# Patient Record
Sex: FEMALE | Race: WHITE | NOT HISPANIC OR LATINO | Employment: FULL TIME | URBAN - METROPOLITAN AREA
[De-identification: names, ages, dates, MRNs, and addresses within clinical notes are randomized per-mention and may not be internally consistent; named-entity substitution may affect disease eponyms.]

---
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
## 2020-02-11 ENCOUNTER — OFFICE VISIT (OUTPATIENT)
Dept: GASTROENTEROLOGY | Facility: CLINIC | Age: 20
End: 2020-02-11

## 2020-02-11 VITALS
TEMPERATURE: 97.7 F | WEIGHT: 170.6 LBS | OXYGEN SATURATION: 99 % | DIASTOLIC BLOOD PRESSURE: 90 MMHG | HEART RATE: 109 BPM | BODY MASS INDEX: 31.39 KG/M2 | HEIGHT: 62 IN | SYSTOLIC BLOOD PRESSURE: 132 MMHG

## 2020-02-11 DIAGNOSIS — K58.1 IRRITABLE BOWEL SYNDROME WITH CONSTIPATION: Primary | ICD-10-CM

## 2020-02-11 DIAGNOSIS — R10.84 GENERALIZED ABDOMINAL PAIN: ICD-10-CM

## 2020-02-11 DIAGNOSIS — Z87.19 HISTORY OF CROHN'S DISEASE: ICD-10-CM

## 2020-02-11 PROCEDURE — 99213 OFFICE O/P EST LOW 20 MIN: CPT | Performed by: NURSE PRACTITIONER

## 2020-02-11 RX ORDER — NAPROXEN 250 MG/1
250 TABLET ORAL 2 TIMES DAILY PRN
COMMUNITY

## 2020-02-11 RX ORDER — CHLORCYCLIZINE HYDROCHLORIDE AND PSEUDOEPHEDRINE HYDROCHLORIDE 25; 60 MG/1; MG/1
TABLET ORAL AS NEEDED
COMMUNITY
Start: 2020-01-06 | End: 2023-02-14

## 2020-02-11 RX ORDER — PANTOPRAZOLE SODIUM 40 MG/1
TABLET, DELAYED RELEASE ORAL DAILY
COMMUNITY
Start: 2020-02-10 | End: 2023-02-14

## 2020-02-11 RX ORDER — HYDROCHLOROTHIAZIDE 12.5 MG/1
TABLET ORAL DAILY
COMMUNITY
Start: 2020-01-23 | End: 2023-02-14

## 2020-02-11 NOTE — PROGRESS NOTES
GASTROENTEROLOGY OUTPATIENT ESTABLISHED PATIENT NOTE  Patient: ANN EUCEDA : 2000  Date of Service: 2020  CC: Follow-up (Ibs, Crohn's disease, constipation)     Assessment/Plan                                             ASSESSMENT & PLANS     Irritable bowel syndrome with constipation  Generalized abdominal pain  History of Crohn's disease  -     ENDOSCOPY AND COLONOSCOPY; Future  · Increase water intake from 48 oz water a day to 60-80 oz a day  · Start OTC stool softeners 2 cap po once daily    Follow Up:Return in about 3 months (around 2020).      DISCUSSION: The above plan was delineated in details with patient and mother and all questions and concerns were answered.  Patient is also given contact information.  Patient is to return as scheduled or sooner if new problems arise.   Subjective                                                     SUBJECTIVE   History of Present Illness  Ms. Ann Euceda is a 19 y.o. female who is here for Follow-up (Ibs, Crohn's disease, constipation)  Burning sensation in the stomach. Drinks more water.  Able to have more BMs, but still has frequent generalized abd pain/tenderness and abn stools. Still a lot of tenesmus. Goes to the bathroom often for BM attempts. Only able to have a BM 50% of the time of going to the bathroom.  Does not like vegetables. Has seen a dietician in the past.   No longer on elavil for HA and anxiety. No longer on Wellbutrin d/t wt gain.  Miralax and Linzess caused diarrhea. Has not taken NSAIDs.      Prior work up here in 2018 w/ CBC, CMP, Celiac panel, CRP, MARJORIE, fecal calprotectin was normal  Hx of nonspecific IBD at age 13 in  and was tx w/ Lialda and [place] by 's pediatric GI at one point.  Repeat EGD and colonoscopy in  did not show evidence of IBD.    Per outside records fr [facility] [place]:   EGD, done on 10/12/15 by Dr Tony Bangura at , showed gastritis, but normal esophagus and duodenum. Gastric bx  showed mild nonspecific chronic gastritis. Esophageal and duodenal bx WNL  Colonoscopy, done on 10/12/15, showed normal colon mucosa to the terminal ileum.     ROS:Review of Systems   Constitutional: Negative.         Pt currently or recently takes NSAIDS ( (i.e Ibuprofen, Aleve, Advil, Exedrin, BC Powder, diclofenac, meloxicam, & Naproxen, etc)? Yes    Pt currently or recently takes abx? No   HENT: Negative.    Eyes: Negative.    Respiratory: Negative.    Cardiovascular: Negative.    Gastrointestinal: Positive for abdominal pain, constipation, diarrhea and nausea.        Burning in stomach   Endocrine: Negative.    Genitourinary: Negative.    Musculoskeletal: Negative.    Skin: Negative.    Allergic/Immunologic: Negative.    Neurological: Negative.    Hematological: Negative.    Psychiatric/Behavioral: Negative.      Objective                                                           OBJECTIVE   Allergy: Pt is allergic to lactose intolerance (gi) and sulfasalazine.  MEDS•  EMGALITY 120 MG/ML prefilled syringe, Every 30 (Thirty) Days., Disp: , Rfl:   •  hydroCHLOROthiazide (HYDRODIURIL) 12.5 MG tablet, Daily., Disp: , Rfl:   •  naproxen (NAPROSYN) 250 MG tablet, Take 250 mg by mouth 2 (Two) Times a Day As Needed., Disp: , Rfl:   •  norethindrone-ethinyl estradiol-iron (MICROGESTIN FE1.5/30) 1.5-30 MG-MCG tablet, Daily., Disp: , Rfl:   •  pantoprazole (PROTONIX) 40 MG EC tablet, Daily., Disp: , Rfl:   •  sertraline (ZOLOFT) 50 MG tablet, 100 mg Daily., Disp: , Rfl:   •  STAHIST AD 25-60 MG tablet, As Needed., Disp: , Rfl:     Lab Results   Component Value Date    WBC 8.10 11/13/2018    EOSABS 0.08 11/13/2018    HGB 14.2 11/13/2018    HCT 42.7 11/13/2018    MCV 86.4 11/13/2018    MCHC 33.3 11/13/2018     11/13/2018     Lab Results   Component Value Date    RDW 12.6 11/13/2018    MCHC 33.3 11/13/2018     Lab Results   Component Value Date     11/13/2018    K 4.2 11/13/2018     11/13/2018    CO2 26.0  11/13/2018    BUN 7 (L) 11/13/2018    CREATININE 0.63 11/13/2018    EGFRIFNONA 123 11/13/2018    GLUCOSE 102 (H) 11/13/2018    CALCIUM 9.3 11/13/2018    ANIONGAP 7.0 11/13/2018     Lab Results   Component Value Date    AST 19 11/13/2018    ALT 21 11/13/2018    ALKPHOS 92 11/13/2018    BILITOT 0.2 (L) 11/13/2018    ALBUMIN 4.53 11/13/2018      Lab Results   Component Value Date    CRP 0.78 11/13/2018      Wt Readings from Last 5 Encounters:   02/11/20 77.4 kg (170 lb 9.6 oz) (92 %, Z= 1.39)*   10/29/19 75.4 kg (166 lb 3.2 oz) (90 %, Z= 1.31)*   02/12/19 69.9 kg (154 lb 3.2 oz) (85 %, Z= 1.05)*   11/13/18 72.1 kg (159 lb) (89 %, Z= 1.20)*     * Growth percentiles are based on CDC (Girls, 2-20 Years) data.   body mass index is 31.2 kg/m².,Temp: 97.7 °F (36.5 °C),BP: 132/90,Heart Rate: 109   Physical Exam  General Well developed; well nourished. NAD  ENT Anicteric sclerae. Oral mucosa pink and moist without thrush or lesions    GI Abd soft, mild generalized, diffused TTP, ND, normal active bowel sounds.  No abd hernia    Pt care team: Emily FREY & ASHLEE Sheets  02/11/20 8:01 AM  [facility]--Gastroenterology  [phone]    CC: , Maria L BURNS, PA   [address] / [facility] [place] [address] FAX:[phone]

## 2020-02-13 ENCOUNTER — TELEPHONE (OUTPATIENT)
Dept: GASTROENTEROLOGY | Facility: CLINIC | Age: 20
End: 2020-02-13

## 2020-02-13 NOTE — TELEPHONE ENCOUNTER
Brianna  pls let pt know that we already did blood and stool tests before and they were normal.    I recommend pt have a f/u office visit w/ Dr Olguin in May since I've seen her 4 times already and unable to help her fully.    .

## 2020-02-13 NOTE — TELEPHONE ENCOUNTER
Emily,  Patient called and left a message stating that she need to cancel her Colonoscopy;its going to cost $2500 which she can't afford right now. She wants to know if there is anything else you can do like stool test, blood work. Request to talk to you. Thanks

## 2023-02-14 ENCOUNTER — OFFICE VISIT (OUTPATIENT)
Dept: FAMILY MEDICINE CLINIC | Facility: CLINIC | Age: 23
End: 2023-02-14
Payer: COMMERCIAL

## 2023-02-14 VITALS
OXYGEN SATURATION: 100 % | SYSTOLIC BLOOD PRESSURE: 144 MMHG | BODY MASS INDEX: 32.16 KG/M2 | HEART RATE: 99 BPM | WEIGHT: 181.5 LBS | HEIGHT: 63 IN | TEMPERATURE: 97.7 F | DIASTOLIC BLOOD PRESSURE: 106 MMHG

## 2023-02-14 DIAGNOSIS — R06.2 WHEEZING: ICD-10-CM

## 2023-02-14 DIAGNOSIS — F31.9 BIPOLAR 1 DISORDER: Primary | ICD-10-CM

## 2023-02-14 DIAGNOSIS — F41.9 ANXIETY: ICD-10-CM

## 2023-02-14 DIAGNOSIS — I10 PRIMARY HYPERTENSION: ICD-10-CM

## 2023-02-14 DIAGNOSIS — G43.709 CHRONIC MIGRAINE WITHOUT AURA WITHOUT STATUS MIGRAINOSUS, NOT INTRACTABLE: ICD-10-CM

## 2023-02-14 DIAGNOSIS — E66.09 CLASS 1 OBESITY DUE TO EXCESS CALORIES WITH SERIOUS COMORBIDITY AND BODY MASS INDEX (BMI) OF 32.0 TO 32.9 IN ADULT: ICD-10-CM

## 2023-02-14 PROBLEM — G43.111 INTRACTABLE MIGRAINE WITH AURA WITH STATUS MIGRAINOSUS: Status: ACTIVE | Noted: 2022-02-16

## 2023-02-14 PROCEDURE — 99204 OFFICE O/P NEW MOD 45 MIN: CPT | Performed by: FAMILY MEDICINE

## 2023-02-14 RX ORDER — LOPERAMIDE HYDROCHLORIDE 2 MG/1
2 CAPSULE ORAL DAILY
COMMUNITY

## 2023-02-14 RX ORDER — ALBUTEROL SULFATE 90 UG/1
2 AEROSOL, METERED RESPIRATORY (INHALATION) EVERY 4 HOURS PRN
Qty: 18 G | Refills: 5 | Status: SHIPPED | OUTPATIENT
Start: 2023-02-14

## 2023-02-14 RX ORDER — OMEPRAZOLE 40 MG/1
40 CAPSULE, DELAYED RELEASE ORAL 2 TIMES DAILY
COMMUNITY
Start: 2022-10-27 | End: 2023-05-22 | Stop reason: SDUPTHER

## 2023-02-14 NOTE — PROGRESS NOTES
Date: 2023   Patient Name: Ann Euceda  : 2000   MRN: [id number]     Chief Complaint:    Chief Complaint   Patient presents with   • Anxiety   • Chest Pain     Difficulty breathing    • Mass     Left arm mass        History of Present Illness: Ann Euceda is a 23 y.o. female who is here today to establish care.  She reports history of depression and anxiety with symptoms of panic attacks including chest pain and difficulty breathing.  She does report that she often has times of severe depression as well as times of anxiety with agitation.  She is not currently on any medication for this.    Blood pressure is significantly elevated in office today.  She reports history of hypertension however has not been taking medication for this anytime recently.  Father has history of hypertension.    She also reports history of episodic migraines and will have more than 4 migraines monthly.  Migraines are debilitating and affect her ability to work.              Review of Systems:   Review of Systems   Constitutional: Negative for chills, fatigue and fever.   Respiratory: Positive for chest tightness and wheezing. Negative for cough and shortness of breath.    Cardiovascular: Negative for chest pain and palpitations.   Gastrointestinal: Negative for abdominal pain, constipation, diarrhea, nausea and vomiting.   Musculoskeletal: Negative for back pain and myalgias.   Neurological: Positive for headache. Negative for dizziness.   Psychiatric/Behavioral: Positive for sleep disturbance, depressed mood and stress. The patient is nervous/anxious.        Past Medical History:   Past Medical History:   Diagnosis Date   • Anxiety    • Bronchitis    • Crohn's disease    • Ear infection    • Gastritis    • GERD (gastroesophageal reflux disease)    • Headache    • Hypertension    • Irritable bowel syndrome    • Lactose intolerance        Past Surgical History:   Past Surgical History:   Procedure  Laterality Date   • CHOLECYSTECTOMY     • COLONOSCOPY      two ( Dr Bangura)   • WISDOM TOOTH EXTRACTION         Family History:   Family History   Problem Relation Age of Onset   • Hyperlipidemia Father    • Hypertension Father    • Hyperlipidemia Sister    • Diabetes Maternal Grandmother    • Hyperlipidemia Maternal Grandfather    • Diabetes Paternal Grandmother    • Hyperlipidemia Paternal Grandfather        Social History:   Social History     Socioeconomic History   • Marital status: Single   Tobacco Use   • Smoking status: Never   • Smokeless tobacco: Never   Vaping Use   • Vaping Use: Never used   Substance and Sexual Activity   • Alcohol use: No   • Drug use: No   • Sexual activity: Never       Medications:     Current Outpatient Medications:   •  loperamide (IMODIUM) 2 MG capsule, Take 1 capsule by mouth Daily., Disp: , Rfl:   •  naproxen (NAPROSYN) 250 MG tablet, Take 1 tablet by mouth 2 (Two) Times a Day As Needed., Disp: , Rfl:   •  omeprazole (priLOSEC) 40 MG capsule, Take 1 capsule by mouth Daily., Disp: , Rfl:   •  albuterol sulfate  (90 Base) MCG/ACT inhaler, Inhale 2 puffs Every 4 (Four) Hours As Needed for Wheezing or Shortness of Air., Disp: 18 g, Rfl: 5  •  Cariprazine HCl (Vraylar) 1.5 MG capsule capsule, Take 1 capsule by mouth Daily., Disp: 30 capsule, Rfl: 1  •  losartan (Cozaar) 25 MG tablet, Take 1 tablet by mouth Daily., Disp: 30 tablet, Rfl: 2  •  Qulipta 60 MG tablet, , Disp: , Rfl:     Allergies:   Allergies   Allergen Reactions   • Sulfamethoxazole-Trimethoprim Unknown - High Severity   • Lactose Intolerance (Gi) Diarrhea   • Sulfa Antibiotics Other (See Comments)   • Sulfasalazine Other (See Comments) and Unknown (See Comments)     Body pain  Body pain       PHQ-2 Total Score: 23   PHQ-9 Total Score: 23       Physical Exam:  Vital Signs:   Vitals:    02/14/23 1442   BP: (!) 144/106   BP Location: Right arm   Patient Position: Sitting   Cuff Size: Adult   Pulse: 99   Temp: 97.7  "°F (36.5 °C)   TempSrc: Temporal   SpO2: 100%   Weight: 82.3 kg (181 lb 8 oz)   Height: 160 cm (63\")     Body mass index is 32.15 kg/m².     Physical Exam  Vitals and nursing note reviewed.   Constitutional:       Appearance: Normal appearance. She is obese.   Cardiovascular:      Rate and Rhythm: Normal rate and regular rhythm.      Heart sounds: Normal heart sounds. No murmur heard.  Pulmonary:      Effort: Pulmonary effort is normal.      Breath sounds: Normal breath sounds. No wheezing.   Abdominal:      General: Bowel sounds are normal.      Palpations: Abdomen is soft.   Neurological:      Mental Status: She is alert and oriented to person, place, and time. Mental status is at baseline.   Psychiatric:         Mood and Affect: Mood is anxious.         Behavior: Behavior normal.           Assessment/Plan:   Diagnoses and all orders for this visit:    1. Bipolar 1 disorder (Primary)  Assessment & Plan:  Psychological condition is newly identified.  Patient was given samples of Vraylar and was instructed to take 1.5 mg every other day.  Side effects discussed  Psychological condition  will be reassessed In 3 weeks.      2. Chronic migraine without aura without status migrainosus, not intractable  Assessment & Plan:  Headaches are worsening.  She was given samples of Qulipta for preventive treatment as well as Ubrelvy for abortive treatment.  Side effects discussed.  Follow-up in 3 weeks          3. Primary hypertension  Assessment & Plan:  Hypertension is worsening.  Weight loss.  Regular aerobic exercise.  Ambulatory blood pressure monitoring.  Patient will monitor ambulatory blood pressures and we will discuss treatment if necessary at follow-up appointment  Blood pressure will be reassessed In 3 weeks.      4. Wheezing  Assessment & Plan:  Refilled albuterol inhaler    Orders:  -     albuterol sulfate  (90 Base) MCG/ACT inhaler; Inhale 2 puffs Every 4 (Four) Hours As Needed for Wheezing or Shortness of " Air.  Dispense: 18 g; Refill: 5    5. Anxiety  Assessment & Plan:  I suspect this is part of her bipolar depression.  Treatment with Vraylar as above      6. Class 1 obesity due to excess calories with serious comorbidity and body mass index (BMI) of 32.0 to 32.9 in adult  Assessment & Plan:  Patient's (Body mass index is 32.15 kg/m².) indicates that they are obese (BMI >30) with health conditions that include hypertension . Weight is unchanged. BMI  is above average; BMI management plan is completed. We discussed portion control and increasing exercise.            Follow Up:   Return in about 3 weeks (around 3/7/2023) for Med Recheck.    Sahara Skaggs, DO  [facility]

## 2023-03-02 ENCOUNTER — TELEPHONE (OUTPATIENT)
Dept: FAMILY MEDICINE CLINIC | Facility: CLINIC | Age: 23
End: 2023-03-02

## 2023-03-02 NOTE — TELEPHONE ENCOUNTER
Caller: Ann Euceda    Relationship: Self    Best call back number:     [phone]     What medication are you requesting:    MEDICATION FOR MIGRAINES    If a prescription is needed, what is your preferred pharmacy and phone number:     [facility] [id number] - [place], [place] - [address] - [phone]  - [phone] FX     DR GARCIA

## 2023-03-06 NOTE — TELEPHONE ENCOUNTER
Patient is wanting to try a migraine medicine, from last visit. She couldn't remember the name. She is doing well on anxiety medicine.

## 2023-03-07 DIAGNOSIS — G43.709 CHRONIC MIGRAINE WITHOUT AURA WITHOUT STATUS MIGRAINOSUS, NOT INTRACTABLE: Primary | ICD-10-CM

## 2023-03-07 RX ORDER — ATOGEPANT 60 MG/1
60 TABLET ORAL DAILY
Qty: 30 TABLET | Refills: 5 | Status: SHIPPED | OUTPATIENT
Start: 2023-03-07 | End: 2023-03-24

## 2023-03-24 ENCOUNTER — OFFICE VISIT (OUTPATIENT)
Dept: FAMILY MEDICINE CLINIC | Facility: CLINIC | Age: 23
End: 2023-03-24
Payer: COMMERCIAL

## 2023-03-24 ENCOUNTER — TELEPHONE (OUTPATIENT)
Dept: FAMILY MEDICINE CLINIC | Facility: CLINIC | Age: 23
End: 2023-03-24

## 2023-03-24 VITALS
TEMPERATURE: 97.5 F | HEIGHT: 63 IN | BODY MASS INDEX: 31.52 KG/M2 | OXYGEN SATURATION: 99 % | HEART RATE: 116 BPM | SYSTOLIC BLOOD PRESSURE: 122 MMHG | DIASTOLIC BLOOD PRESSURE: 90 MMHG | WEIGHT: 177.9 LBS

## 2023-03-24 DIAGNOSIS — E66.09 CLASS 1 OBESITY DUE TO EXCESS CALORIES WITH SERIOUS COMORBIDITY AND BODY MASS INDEX (BMI) OF 31.0 TO 31.9 IN ADULT: ICD-10-CM

## 2023-03-24 DIAGNOSIS — R50.9 FEVER, UNSPECIFIED FEVER CAUSE: ICD-10-CM

## 2023-03-24 DIAGNOSIS — U07.1 COVID-19 VIRUS INFECTION: Primary | ICD-10-CM

## 2023-03-24 DIAGNOSIS — I10 PRIMARY HYPERTENSION: ICD-10-CM

## 2023-03-24 PROBLEM — E66.811 CLASS 1 OBESITY DUE TO EXCESS CALORIES WITH SERIOUS COMORBIDITY AND BODY MASS INDEX (BMI) OF 31.0 TO 31.9 IN ADULT: Status: ACTIVE | Noted: 2023-03-24

## 2023-03-24 PROBLEM — K58.9 IRRITABLE BOWEL SYNDROME: Status: ACTIVE | Noted: 2023-03-24

## 2023-03-24 PROBLEM — G43.909 MIGRAINE: Status: ACTIVE | Noted: 2023-03-24

## 2023-03-24 LAB
EXPIRATION DATE: ABNORMAL
FLUAV AG UPPER RESP QL IA.RAPID: NOT DETECTED
FLUBV AG UPPER RESP QL IA.RAPID: NOT DETECTED
INTERNAL CONTROL: ABNORMAL
Lab: ABNORMAL
SARS-COV-2 AG UPPER RESP QL IA.RAPID: DETECTED

## 2023-03-24 RX ORDER — PROMETHAZINE HYDROCHLORIDE 25 MG/1
TABLET ORAL
COMMUNITY
Start: 2023-03-13

## 2023-03-24 RX ORDER — LOSARTAN POTASSIUM 25 MG/1
25 TABLET ORAL DAILY
Qty: 30 TABLET | Refills: 2 | Status: SHIPPED | OUTPATIENT
Start: 2023-03-24

## 2023-03-24 NOTE — LETTER
March 24, 2023     Patient: Ann Euceda   YOB: 2000   Date of Visit: 3/24/2023       To Whom It May Concern:    It is my medical opinion that Ann Euceda may return to work in 5 days on 03/28/2023. She will need to wear a mask for a total of 10 days.           Sincerely,        Sahara Skaggs,     CC: No Recipients

## 2023-03-24 NOTE — ASSESSMENT & PLAN NOTE
Patient will use Tylenol and ibuprofen as needed for fever, headache and body aches.  She may use over-the-counter cough and cold medications as needed.  She was counseled about transmission and the need to wear a mask in public for total of 10 days.  She will call if symptoms are persistent or worsening

## 2023-03-24 NOTE — TELEPHONE ENCOUNTER
Caller: Ann Euceda    Relationship: Self    Best call back number: [phone]    What form or medical record are you requesting: EXCUSE FROM WORK NOTE    Who is requesting this form or medical record from you: PATIENT FOR EMPLOYER    How would you like to receive the form or medical records (pick-up, mail, fax): FAX    If fax, what is the fax number: [phone]    Timeframe paperwork needed: AS SOON AS POSSIBLE    Additional notes: PATIENT STATED SHE WAS IN TODAY AND FORGOT TO ASK FOR A EXCUSE NOTE FOR WORK

## 2023-03-24 NOTE — ASSESSMENT & PLAN NOTE
Hypertension is newly identified.  Weight loss.  Regular aerobic exercise.  Ambulatory blood pressure monitoring.  Patient is not currently sexually active and does not plan to be anytime soon Rx losartan 25 mg daily, side effects discussed including risk for teratogenicity  Blood pressure will be reassessed in 4 weeks.

## 2023-03-24 NOTE — ASSESSMENT & PLAN NOTE
Patient's (Body mass index is 31.51 kg/m².) indicates that they are obese (BMI >30) with health conditions that include hypertension . Weight is unchanged. BMI  is above average; BMI management plan is completed. We discussed portion control and increasing exercise.

## 2023-03-24 NOTE — PROGRESS NOTES
Date: 2023   Patient Name: Ann Euceda  : 2000   MRN: [id number]     Chief Complaint:    Chief Complaint   Patient presents with   • Headache   • Vomiting       History of Present Illness: Ann Euceda is a 22 y.o. female who is here today for Headache, body aches, and vomiting.  A close friend was recently diagnosed with COVID and she has been in close contact with her and has shared food.  Symptoms started last night.    She also reports that ambulatory blood pressures have been elevated on multiple occasions.  She has family history of hypertension and would like to discuss treatment options.           Review of Systems:   Review of Systems   Constitutional: Positive for chills and fever. Negative for fatigue.   HENT: Positive for congestion, ear pain, postnasal drip and sinus pressure.    Respiratory: Positive for cough. Negative for shortness of breath.    Cardiovascular: Negative for chest pain and palpitations.   Gastrointestinal: Negative for abdominal pain, constipation, diarrhea, nausea and vomiting.   Musculoskeletal: Negative for back pain and myalgias.   Neurological: Positive for headache. Negative for dizziness.   Psychiatric/Behavioral: Negative for depressed mood. The patient is not nervous/anxious.        Past Medical History:   Past Medical History:   Diagnosis Date   • Anxiety    • Bronchitis    • Crohn's disease (HCC)    • Ear infection    • Gastritis    • GERD (gastroesophageal reflux disease)    • Headache    • Hypertension    • Irritable bowel syndrome    • Lactose intolerance        Past Surgical History:   Past Surgical History:   Procedure Laterality Date   • CHOLECYSTECTOMY     • COLONOSCOPY      two ( Dr Bangura)   • WISDOM TOOTH EXTRACTION         Family History:   Family History   Problem Relation Age of Onset   • Hyperlipidemia Father    • Hypertension Father    • Hyperlipidemia Sister    • Diabetes Maternal Grandmother    • Hyperlipidemia Maternal  "Grandfather    • Diabetes Paternal Grandmother    • Hyperlipidemia Paternal Grandfather        Social History:   Social History     Socioeconomic History   • Marital status: Single   Tobacco Use   • Smoking status: Never   • Smokeless tobacco: Never   Vaping Use   • Vaping Use: Never used   Substance and Sexual Activity   • Alcohol use: No   • Drug use: No   • Sexual activity: Never       Medications:     Current Outpatient Medications:   •  albuterol sulfate  (90 Base) MCG/ACT inhaler, Inhale 2 puffs Every 4 (Four) Hours As Needed for Wheezing or Shortness of Air., Disp: 18 g, Rfl: 5  •  loperamide (IMODIUM) 2 MG capsule, Take 1 capsule by mouth Daily., Disp: , Rfl:   •  naproxen (NAPROSYN) 250 MG tablet, Take 1 tablet by mouth 2 (Two) Times a Day As Needed., Disp: , Rfl:   •  omeprazole (priLOSEC) 40 MG capsule, Take 1 capsule by mouth Daily., Disp: , Rfl:   •  losartan (Cozaar) 25 MG tablet, Take 1 tablet by mouth Daily., Disp: 30 tablet, Rfl: 2  •  promethazine (PHENERGAN) 25 MG tablet, , Disp: , Rfl:     Allergies:   Allergies   Allergen Reactions   • Sulfamethoxazole-Trimethoprim Unknown - High Severity   • Lactose Intolerance (Gi) Diarrhea   • Sulfa Antibiotics Other (See Comments)   • Sulfasalazine Other (See Comments) and Unknown (See Comments)     Body pain  Body pain         Physical Exam:  Vital Signs:   Vitals:    03/24/23 0929   BP: 122/90   BP Location: Left arm   Patient Position: Sitting   Cuff Size: Adult   Pulse: 116   Temp: 97.5 °F (36.4 °C)   TempSrc: Temporal   SpO2: 99%   Weight: 80.7 kg (177 lb 14.4 oz)   Height: 160 cm (63\")     Body mass index is 31.51 kg/m².     Physical Exam  Vitals and nursing note reviewed.   Constitutional:       Appearance: Normal appearance. She is obese.   HENT:      Right Ear: A middle ear effusion is present.      Left Ear: A middle ear effusion is present.   Cardiovascular:      Rate and Rhythm: Normal rate and regular rhythm.      Heart sounds: Normal " heart sounds. No murmur heard.  Pulmonary:      Effort: Pulmonary effort is normal.      Breath sounds: Normal breath sounds. No wheezing.   Neurological:      Mental Status: She is alert and oriented to person, place, and time. Mental status is at baseline.   Psychiatric:         Mood and Affect: Mood normal.         Behavior: Behavior normal.           Assessment/Plan:   Diagnoses and all orders for this visit:    1. COVID-19 virus infection (Primary)  Assessment & Plan:  Patient will use Tylenol and ibuprofen as needed for fever, headache and body aches.  She may use over-the-counter cough and cold medications as needed.  She was counseled about transmission and the need to wear a mask in public for total of 10 days.  She will call if symptoms are persistent or worsening      2. Primary hypertension  Assessment & Plan:  Hypertension is newly identified.  Weight loss.  Regular aerobic exercise.  Ambulatory blood pressure monitoring.  Patient is not currently sexually active and does not plan to be anytime soon Rx losartan 25 mg daily, side effects discussed including risk for teratogenicity  Blood pressure will be reassessed in 4 weeks.    Orders:  -     losartan (Cozaar) 25 MG tablet; Take 1 tablet by mouth Daily.  Dispense: 30 tablet; Refill: 2    3. Class 1 obesity due to excess calories with serious comorbidity and body mass index (BMI) of 31.0 to 31.9 in adult  Assessment & Plan:  Patient's (Body mass index is 31.51 kg/m².) indicates that they are obese (BMI >30) with health conditions that include hypertension . Weight is unchanged. BMI  is above average; BMI management plan is completed. We discussed portion control and increasing exercise.       4. Fever, unspecified fever cause  -     Covid-19 + Flu A&B AG, Veritor         Follow Up:   Return in about 1 month (around 4/24/2023) for BP check.    Sahara Skaggs, DO  [facility]

## 2023-04-04 ENCOUNTER — TELEPHONE (OUTPATIENT)
Dept: FAMILY MEDICINE CLINIC | Facility: CLINIC | Age: 23
End: 2023-04-04

## 2023-04-04 NOTE — TELEPHONE ENCOUNTER
PATIENT WAS SEEN LAST WEEK OR WEEK BEFORE.  HAS POSITIVE COVID.  HAS A LOT OF NAUSEA.  PHENERGAN NOT HELPING.  WHAT TO DO?    PATIENT WAS ON SAMPLE OF VERALIN(?) AND IS OUT.  NEEDS A PRESCRIPTION.      [facility]:  [facility]    PLEASE CALL [phone]

## 2023-04-10 ENCOUNTER — TELEMEDICINE (OUTPATIENT)
Dept: FAMILY MEDICINE CLINIC | Facility: CLINIC | Age: 23
End: 2023-04-10
Payer: COMMERCIAL

## 2023-04-10 VITALS — BODY MASS INDEX: 31.01 KG/M2 | WEIGHT: 175 LBS | HEIGHT: 63 IN

## 2023-04-10 DIAGNOSIS — F31.9 BIPOLAR 1 DISORDER: Primary | ICD-10-CM

## 2023-04-10 DIAGNOSIS — R11.0 NAUSEA: ICD-10-CM

## 2023-04-10 RX ORDER — ATOGEPANT 60 MG/1
TABLET ORAL
COMMUNITY
Start: 2023-03-30

## 2023-04-10 NOTE — ASSESSMENT & PLAN NOTE
Psychological condition was improving when patient was taken Vraylar.  I am going to send in a prescription for starting dose Vraylar and have her follow-up in a month.

## 2023-04-10 NOTE — PROGRESS NOTES
"Chief Complaint  Nausea (She has been having nausea for several days now.  She also needs a refill on the \"bipolar medication\" Dr. Skaggs gave her. )    Subjective  Patient presents during the COVID-19  pandemic/federally declared state of public health emergency.  For today's visit this provider is located at [facility]. Patient was seen today through synchronous audio/video technology using [email or web address] technology. Verbal consent was obtained. The patient was located at home. Vitals signs were not obtained due to lack of home monitoring access. Time spent with patient was 15 minutes.        Ann Euceda presents to [facility]  History of Present Illness patient states that she has been having nausea for several days now apparently she started having nausea when she had COVID 2 or 3 weeks ago and it seemed to get better then it started back up again.  She has not had any vomiting and its not been associated with reflux or heartburn or abdominal pain.  She was taking a medicine for bipolar that started with a V that Dr. Marlene jordan gave her samples of she cannot recall exactly what it was nor can she tell me what dose she was taking but she ran out of that several days ago and she feels like she needs to be back on it.  She called to get it refilled and was told that she needed an appointment.  Objective   Vital Signs:   Ht 160 cm (63\")   Wt 79.4 kg (175 lb) Comment: Pt reported vitals  BMI 31.00 kg/m²     Body mass index is 31 kg/m².    Review of Systems   Constitutional: Negative for chills, fatigue and fever.   Respiratory: Negative for cough and shortness of breath.    Cardiovascular: Negative for chest pain and palpitations.   Gastrointestinal: Positive for nausea. Negative for abdominal pain, diarrhea, vomiting and GERD.   Musculoskeletal: Negative for back pain and myalgias.   Neurological: Negative for dizziness and headache.   Psychiatric/Behavioral: " Positive for sleep disturbance. Negative for suicidal ideas and depressed mood. The patient is not nervous/anxious.         Irritable, manic       Past History:  Medical History: has a past medical history of Anxiety, Bronchitis, Crohn's disease, Ear infection, Gastritis, GERD (gastroesophageal reflux disease), Headache, Hypertension, Irritable bowel syndrome, and Lactose intolerance.   Surgical History: has a past surgical history that includes Colonoscopy; Cholecystectomy; and [place] tooth extraction.   Family History: family history includes Diabetes in her maternal grandmother and paternal grandmother; Hyperlipidemia in her father, maternal grandfather, paternal grandfather, and sister; Hypertension in her father.   Social History: reports that she has never smoked. She has never used smokeless tobacco. She reports that she does not drink alcohol and does not use drugs.      Current Outpatient Medications:   •  albuterol sulfate  (90 Base) MCG/ACT inhaler, Inhale 2 puffs Every 4 (Four) Hours As Needed for Wheezing or Shortness of Air., Disp: 18 g, Rfl: 5  •  loperamide (IMODIUM) 2 MG capsule, Take 1 capsule by mouth Daily., Disp: , Rfl:   •  losartan (Cozaar) 25 MG tablet, Take 1 tablet by mouth Daily., Disp: 30 tablet, Rfl: 2  •  naproxen (NAPROSYN) 250 MG tablet, Take 1 tablet by mouth 2 (Two) Times a Day As Needed., Disp: , Rfl:   •  omeprazole (priLOSEC) 40 MG capsule, Take 1 capsule by mouth Daily., Disp: , Rfl:   •  Qulipta 60 MG tablet, , Disp: , Rfl:   •  Cariprazine HCl (Vraylar) 1.5 MG capsule capsule, Take 1 capsule by mouth Daily., Disp: 30 capsule, Rfl: 1    Allergies: Sulfamethoxazole-trimethoprim, Lactose intolerance (gi), Sulfa antibiotics, and Sulfasalazine    Physical Exam  Neurological:      Mental Status: She is alert and oriented to person, place, and time.   Psychiatric:         Mood and Affect: Mood normal.         Behavior: Behavior normal.          Result Review :                    Assessment and Plan    Diagnoses and all orders for this visit:    1. Bipolar 1 disorder (Primary)  Assessment & Plan:  Psychological condition was improving when patient was taken Vraylar.  I am going to send in a prescription for starting dose Vraylar and have her follow-up in a month.      2. Nausea  Assessment & Plan:  No other associated symptoms with the nausea and I told her that it may be that she has been off the Vraylar and that could be causing her nausea.  I wanted to see if the nausea would improve once she starts back on the Vraylar and if not she can schedule an appointment in office.      Other orders  -     Cariprazine HCl (Vraylar) 1.5 MG capsule capsule; Take 1 capsule by mouth Daily.  Dispense: 30 capsule; Refill: 1      Follow Up   No follow-ups on file.  Patient was given instructions and counseling regarding her condition or for health maintenance advice. Please see specific information pulled into the AVS if appropriate.     Vesta Quinones PA-C

## 2023-04-10 NOTE — ASSESSMENT & PLAN NOTE
No other associated symptoms with the nausea and I told her that it may be that she has been off the Vraylar and that could be causing her nausea.  I wanted to see if the nausea would improve once she starts back on the Vraylar and if not she can schedule an appointment in office.

## 2023-04-17 NOTE — ASSESSMENT & PLAN NOTE
Patient's (Body mass index is 32.15 kg/m².) indicates that they are obese (BMI >30) with health conditions that include hypertension . Weight is unchanged. BMI  is above average; BMI management plan is completed. We discussed portion control and increasing exercise.

## 2023-04-17 NOTE — ASSESSMENT & PLAN NOTE
Psychological condition is newly identified.  Patient was given samples of Vraylar and was instructed to take 1.5 mg every other day.  Side effects discussed  Psychological condition  will be reassessed In 3 weeks.

## 2023-04-17 NOTE — ASSESSMENT & PLAN NOTE
Hypertension is worsening.  Weight loss.  Regular aerobic exercise.  Ambulatory blood pressure monitoring.  Patient will monitor ambulatory blood pressures and we will discuss treatment if necessary at follow-up appointment  Blood pressure will be reassessed In 3 weeks.

## 2023-04-17 NOTE — ASSESSMENT & PLAN NOTE
Headaches are worsening.  She was given samples of Qulipta for preventive treatment as well as Ubrelvy for abortive treatment.  Side effects discussed.  Follow-up in 3 weeks

## 2023-05-22 ENCOUNTER — OFFICE VISIT (OUTPATIENT)
Dept: FAMILY MEDICINE CLINIC | Facility: CLINIC | Age: 23
End: 2023-05-22
Payer: COMMERCIAL

## 2023-05-22 VITALS
HEART RATE: 88 BPM | BODY MASS INDEX: 30.19 KG/M2 | OXYGEN SATURATION: 99 % | HEIGHT: 63 IN | DIASTOLIC BLOOD PRESSURE: 80 MMHG | WEIGHT: 170.4 LBS | SYSTOLIC BLOOD PRESSURE: 122 MMHG

## 2023-05-22 DIAGNOSIS — R11.0 NAUSEA: ICD-10-CM

## 2023-05-22 DIAGNOSIS — K52.9 IBD (INFLAMMATORY BOWEL DISEASE): ICD-10-CM

## 2023-05-22 DIAGNOSIS — F31.9 BIPOLAR 1 DISORDER: Primary | ICD-10-CM

## 2023-05-22 RX ORDER — NADOLOL 20 MG/1
20 TABLET ORAL DAILY
Qty: 30 TABLET | Refills: 5 | Status: SHIPPED | OUTPATIENT
Start: 2023-05-22

## 2023-05-22 RX ORDER — OMEPRAZOLE 40 MG/1
40 CAPSULE, DELAYED RELEASE ORAL 2 TIMES DAILY
Qty: 60 CAPSULE | Refills: 11 | Status: SHIPPED | OUTPATIENT
Start: 2023-05-22 | End: 2024-05-21

## 2023-05-22 RX ORDER — ARIPIPRAZOLE 5 MG/1
5 TABLET ORAL DAILY
Qty: 30 TABLET | Refills: 5 | Status: SHIPPED | OUTPATIENT
Start: 2023-05-22

## 2023-05-22 NOTE — ASSESSMENT & PLAN NOTE
This is not improving and she is proved difficult to treat having tried several medications with either no effect or resulting side effects I have suggested that she may need to see psychiatry for official testing and diagnostic criteria.  I tere options for treatment and she does want to try something in the same class of medicine as Vraylar and we opted to try Abilify and she understands the risks associated with Abilify as well as the potential side effects.  I have given her some information about local psychiatry offices that she can contact for an appointment.

## 2023-05-22 NOTE — ASSESSMENT & PLAN NOTE
Review of her medicines revealed that in March she started Cozaar and it is possible that this may be causing her nausea so I am going to switch her from the Cozaar to nadolol for blood pressure control I also think the beta-blocker may give her some added anxiety benefit.  We will see her back in 6 weeks to see how she is responding.

## 2023-05-22 NOTE — PROGRESS NOTES
"Chief Complaint  Nausea (X2 months) and Depression (Patient wants to discuss her med)    Subjective          Ann Euceda presents to [facility]  History of Present Illness patient reports that she continues to have nausea it started in March she had COVID back in March and thought  it might be related to that.  She has not had any vomiting or abdominal pain she does have chronic reflux and chronic IBS and she has been reading about the Xifaxan as treatment for IBS with diarrhea and wondered if she would be a candidate to try that.  She also feels like the Vraylar is causing side effects of insomnia and restless leg and she like to try something different for her bipolar depression.  She also does not think the Vraylar is helping much.  She has tried several different antidepressants and nothing has been helpful.  She has had success with controlling her migraines on the Qulipta and she still reports reflux symptoms and is wondering if she can increase her omeprazole.    Objective   Vital Signs:   /80 (BP Location: Right arm)   Pulse 88   Ht 160 cm (63\")   Wt 77.3 kg (170 lb 6.4 oz)   SpO2 99%   BMI 30.19 kg/m²     Body mass index is 30.19 kg/m².    Review of Systems   Constitutional: Negative for chills, fatigue and fever.   Respiratory: Negative for cough and shortness of breath.    Cardiovascular: Negative for chest pain and palpitations.   Gastrointestinal: Positive for diarrhea, nausea and GERD.   Musculoskeletal: Negative for back pain and myalgias.   Neurological: Negative for dizziness and headache.   Psychiatric/Behavioral: Positive for sleep disturbance, positive for hyperactivity, depressed mood and stress. Negative for suicidal ideas. The patient is nervous/anxious.         Angers easily.        Past History:  Medical History: has a past medical history of Anxiety, Bronchitis, Crohn's disease, Ear infection, Gastritis, GERD (gastroesophageal reflux " disease), Headache, Hypertension, Irritable bowel syndrome, and Lactose intolerance.   Surgical History: has a past surgical history that includes Colonoscopy; Cholecystectomy; and [place] tooth extraction.   Family History: family history includes Diabetes in her maternal grandmother and paternal grandmother; Hyperlipidemia in her father, maternal grandfather, paternal grandfather, and sister; Hypertension in her father.   Social History: reports that she has never smoked. She has never used smokeless tobacco. She reports that she does not drink alcohol and does not use drugs.      Current Outpatient Medications:   •  albuterol sulfate  (90 Base) MCG/ACT inhaler, Inhale 2 puffs Every 4 (Four) Hours As Needed for Wheezing or Shortness of Air., Disp: 18 g, Rfl: 5  •  loperamide (IMODIUM) 2 MG capsule, Take 1 capsule by mouth Daily., Disp: , Rfl:   •  naproxen (NAPROSYN) 250 MG tablet, Take 1 tablet by mouth 2 (Two) Times a Day As Needed., Disp: , Rfl:   •  omeprazole (priLOSEC) 40 MG capsule, Take 1 capsule by mouth 2 (Two) Times a Day., Disp: 60 capsule, Rfl: 11  •  Qulipta 60 MG tablet, , Disp: , Rfl:   •  ARIPiprazole (Abilify) 5 MG tablet, Take 1 tablet by mouth Daily., Disp: 30 tablet, Rfl: 5  •  riFAXIMin (Xifaxan) 550 MG tablet, Take 1 tablet by mouth Every 8 (Eight) Hours., Disp: 42 tablet, Rfl: 0    Allergies: Sulfamethoxazole-trimethoprim, Lactose intolerance (gi), Sulfa antibiotics, and Sulfasalazine    Physical Exam  Vitals reviewed.   Constitutional:       Appearance: Normal appearance.   Cardiovascular:      Rate and Rhythm: Normal rate and regular rhythm.      Heart sounds: Normal heart sounds.   Pulmonary:      Effort: Pulmonary effort is normal.      Breath sounds: Normal breath sounds.   Abdominal:      General: Bowel sounds are normal.      Palpations: Abdomen is soft.   Musculoskeletal:         General: Normal range of motion.   Neurological:      General: No focal deficit present.       Mental Status: She is alert and oriented to person, place, and time.   Psychiatric:         Mood and Affect: Mood is anxious. Affect is flat.         Speech: Speech normal.         Behavior: Behavior is cooperative.         Thought Content: Thought content normal.         Cognition and Memory: Cognition normal.         Judgment: Judgment normal.          Result Review :                   Assessment and Plan    Diagnoses and all orders for this visit:    1. Bipolar 1 disorder (Primary)  Assessment & Plan:    This is not improving and she is proved difficult to treat having tried several medications with either no effect or resulting side effects I have suggested that she may need to see psychiatry for official testing and diagnostic criteria.  I tere options for treatment and she does want to try something in the same class of medicine as Vraylar and we opted to try Abilify and she understands the risks associated with Abilify as well as the potential side effects.  I have given her some information about local psychiatry offices that she can contact for an appointment.      2. IBD (inflammatory bowel disease)  Assessment & Plan:  I am going to put her on a 2-week trial of Xifaxan to see if it would help her IBS symptoms.      3. Nausea  Assessment & Plan:  Review of her medicines revealed that in March she started Cozaar and it is possible that this may be causing her nausea so I am going to switch her from the Cozaar to nadolol for blood pressure control I also think the beta-blocker may give her some added anxiety benefit.  We will see her back in 6 weeks to see how she is responding.      Other orders  -     ARIPiprazole (Abilify) 5 MG tablet; Take 1 tablet by mouth Daily.  Dispense: 30 tablet; Refill: 5  -     riFAXIMin (Xifaxan) 550 MG tablet; Take 1 tablet by mouth Every 8 (Eight) Hours.  Dispense: 42 tablet; Refill: 0  -     omeprazole (priLOSEC) 40 MG capsule; Take 1 capsule by mouth 2 (Two) Times a Day.   Dispense: 60 capsule; Refill: 11      Follow Up   Return in about 6 weeks (around 7/3/2023) for Recheck.  Patient was given instructions and counseling regarding her condition or for health maintenance advice. Please see specific information pulled into the AVS if appropriate.     Vesta Quinones PA-C

## 2023-06-21 ENCOUNTER — TELEPHONE (OUTPATIENT)
Dept: FAMILY MEDICINE CLINIC | Facility: CLINIC | Age: 23
End: 2023-06-21

## 2023-06-21 NOTE — TELEPHONE ENCOUNTER
Caller: Ann Euceda    Relationship: Self    Best call back number: [phone]    What test/procedure/medication requested: riFAXIMin (Xifaxan) 550 MG tablet    When is it needed: ASAP    Where is the test/procedure going to be performed: [facility] [id number] - [place], [place] - [address] AT [address] - [phone]  - [phone]    Additional information or concerns: PATIENT WAS SEEN ON 05/22/2023 AND THIS MEDICATION WAS CALLED IN FOR HER. PATIENTS PHARMACY INFORMED HER THEY ARE STILL WANTING ON THE PRIOR AUTHORIZATION FROM HER DOCTOR. PLEASE ADVISE

## 2023-07-03 ENCOUNTER — TELEPHONE (OUTPATIENT)
Dept: FAMILY MEDICINE CLINIC | Facility: CLINIC | Age: 23
End: 2023-07-03

## 2023-07-03 NOTE — TELEPHONE ENCOUNTER
ARIPiprazole (Abilify) 5 MG tablet .  PATIENT STATES MEDICATION IS NOT HELPING HER ANXIETY.  WHAT TO DO?    PLEASE CALL [phone]

## 2023-07-18 PROBLEM — E55.9 VITAMIN D DEFICIENCY: Status: ACTIVE | Noted: 2023-07-18

## 2024-01-18 ENCOUNTER — OFFICE VISIT (OUTPATIENT)
Dept: FAMILY MEDICINE CLINIC | Facility: CLINIC | Age: 24
End: 2024-01-18
Payer: COMMERCIAL

## 2024-01-18 VITALS
DIASTOLIC BLOOD PRESSURE: 76 MMHG | SYSTOLIC BLOOD PRESSURE: 118 MMHG | HEIGHT: 63 IN | HEART RATE: 86 BPM | BODY MASS INDEX: 26.19 KG/M2 | WEIGHT: 147.8 LBS | OXYGEN SATURATION: 100 %

## 2024-01-18 DIAGNOSIS — F31.9 BIPOLAR 1 DISORDER: ICD-10-CM

## 2024-01-18 DIAGNOSIS — I10 PRIMARY HYPERTENSION: Primary | ICD-10-CM

## 2024-01-18 DIAGNOSIS — G43.E09 CHRONIC MIGRAINE WITH AURA WITHOUT STATUS MIGRAINOSUS, NOT INTRACTABLE: ICD-10-CM

## 2024-01-18 PROCEDURE — 99214 OFFICE O/P EST MOD 30 MIN: CPT | Performed by: PHYSICIAN ASSISTANT

## 2024-01-18 RX ORDER — ATOGEPANT 60 MG/1
60 TABLET ORAL DAILY
Qty: 90 TABLET | Refills: 1 | Status: SHIPPED | OUTPATIENT
Start: 2024-01-18

## 2024-01-18 RX ORDER — ARIPIPRAZOLE 10 MG/1
10 TABLET ORAL DAILY
Qty: 30 TABLET | Refills: 5 | Status: SHIPPED | OUTPATIENT
Start: 2024-01-18

## 2024-01-18 RX ORDER — HYDROXYZINE HYDROCHLORIDE 25 MG/1
TABLET, FILM COATED ORAL
Qty: 90 TABLET | Refills: 1 | Status: SHIPPED | OUTPATIENT
Start: 2024-01-18

## 2024-01-18 NOTE — ASSESSMENT & PLAN NOTE
Psychological condition is improving with treatment.  Continue current treatment regimen.  Psychological condition  will be reassessed  at next regular appointment .   She has requested a referral to a mental health specialist and this will be done today.

## 2024-01-18 NOTE — PROGRESS NOTES
"Chief Complaint  Hypertension    Subjective          Ann Euceda presents to [facility]    Hypertension  Pertinent negatives include no chest pain, palpitations or shortness of breath.     patient is here today for recheck on her Hypertension, Bipolar disorder, and chronic migraines.  Needs her medications refilled for these.   She also has not had any luck in finding mental health provider who will prescribe her medications and she would like a referral to someone.  She does think that her medication is working but she thinks she could be better but really wants to discuss options with a mental health professional.      Objective   Vital Signs:   /76 (BP Location: Right arm, Patient Position: Sitting, Cuff Size: Adult)   Pulse 86   Ht 160 cm (63\")   Wt 67 kg (147 lb 12.8 oz)   SpO2 100%   BMI 26.18 kg/m²     Body mass index is 26.18 kg/m².    Review of Systems   Constitutional:  Negative for chills, fatigue and fever.   Respiratory:  Negative for cough and shortness of breath.    Cardiovascular:  Negative for chest pain and palpitations.   Musculoskeletal:  Negative for back pain and myalgias.   Neurological:  Negative for dizziness and headache.   Psychiatric/Behavioral:  Positive for depressed mood. Negative for self-injury, sleep disturbance and suicidal ideas. The patient is nervous/anxious.        Past History:  Medical History: has a past medical history of Anxiety, Bronchitis, Crohn's disease, Ear infection, Gastritis, GERD (gastroesophageal reflux disease), Headache, Hypertension, Irritable bowel syndrome, and Lactose intolerance.   Surgical History: has a past surgical history that includes Colonoscopy; Cholecystectomy; and [place] tooth extraction.   Family History: family history includes Diabetes in her maternal grandmother and paternal grandmother; Hyperlipidemia in her father, maternal grandfather, paternal grandfather, and sister; Hypertension in her " father.   Social History: reports that she has never smoked. She has never used smokeless tobacco. She reports that she does not currently use alcohol. She reports that she does not use drugs.      Current Outpatient Medications:     albuterol sulfate  (90 Base) MCG/ACT inhaler, Inhale 2 puffs Every 4 (Four) Hours As Needed for Wheezing or Shortness of Air., Disp: 18 g, Rfl: 5    ARIPiprazole (Abilify) 10 MG tablet, Take 1 tablet by mouth Daily., Disp: 30 tablet, Rfl: 5    dicyclomine (BENTYL) 10 MG capsule, Take 1 or 2 three times a day 30 minutes prior to a meal for IBS, Disp: 90 capsule, Rfl: 1    hydrOXYzine (ATARAX) 25 MG tablet, Take 1 or 2 every 8 hours as needed for anxiety., Disp: 90 tablet, Rfl: 1    loperamide (IMODIUM) 2 MG capsule, Take 1 capsule by mouth Daily., Disp: , Rfl:     nadolol (Corgard) 20 MG tablet, Take 1 tablet by mouth Daily., Disp: 30 tablet, Rfl: 5    naproxen (NAPROSYN) 250 MG tablet, Take 1 tablet by mouth 2 (Two) Times a Day As Needed., Disp: , Rfl:     omeprazole (priLOSEC) 40 MG capsule, Take 1 capsule by mouth 2 (Two) Times a Day., Disp: 60 capsule, Rfl: 11    Qulipta 60 MG tablet, Take 1 tablet by mouth Daily., Disp: 90 tablet, Rfl: 1    Allergies: Sulfamethoxazole-trimethoprim, Lactose intolerance (gi), Sulfa antibiotics, and Sulfasalazine    Physical Exam  Vitals reviewed.   Constitutional:       Appearance: Normal appearance.   Cardiovascular:      Rate and Rhythm: Normal rate and regular rhythm.      Heart sounds: Normal heart sounds.   Pulmonary:      Effort: Pulmonary effort is normal.      Breath sounds: Normal breath sounds.   Abdominal:      General: Bowel sounds are normal.      Palpations: Abdomen is soft.   Musculoskeletal:         General: Normal range of motion.   Neurological:      General: No focal deficit present.      Mental Status: She is alert and oriented to person, place, and time.   Psychiatric:         Mood and Affect: Mood normal.          Result  Review :                   Assessment and Plan    Diagnoses and all orders for this visit:    1. Primary hypertension (Primary)  Assessment & Plan:  Hypertension is well controlled.  Continue present care no changes meds refilled.      2. Bipolar 1 disorder  Assessment & Plan:  Psychological condition is improving with treatment.  Continue current treatment regimen.  Psychological condition  will be reassessed  at next regular appointment .   She has requested a referral to a mental health specialist and this will be done today.      Orders:  -     Ambulatory Referral to Behavioral Health    3. Chronic migraine with aura without status migrainosus, not intractable    Other orders  -     ARIPiprazole (Abilify) 10 MG tablet; Take 1 tablet by mouth Daily.  Dispense: 30 tablet; Refill: 5  -     hydrOXYzine (ATARAX) 25 MG tablet; Take 1 or 2 every 8 hours as needed for anxiety.  Dispense: 90 tablet; Refill: 1  -     Qulipta 60 MG tablet; Take 1 tablet by mouth Daily.  Dispense: 90 tablet; Refill: 1        Follow Up   Return in about 6 months (around 7/18/2024) for Annual physical.  Patient was given instructions and counseling regarding her condition or for health maintenance advice. Please see specific information pulled into the AVS if appropriate.     Vesta Quinones PA-C

## 2024-02-15 RX ORDER — BUSPIRONE HYDROCHLORIDE 10 MG/1
10 TABLET ORAL 3 TIMES DAILY
Qty: 90 TABLET | Refills: 1 | Status: SHIPPED | OUTPATIENT
Start: 2024-02-15

## 2024-03-14 RX ORDER — NADOLOL 20 MG/1
20 TABLET ORAL DAILY
Qty: 30 TABLET | Refills: 5 | Status: SHIPPED | OUTPATIENT
Start: 2024-03-14

## 2024-03-22 RX ORDER — ATOGEPANT 60 MG/1
60 TABLET ORAL DAILY
Qty: 30 TABLET | Refills: 1 | Status: SHIPPED | OUTPATIENT
Start: 2024-03-22

## 2024-04-15 RX ORDER — BUSPIRONE HYDROCHLORIDE 10 MG/1
10 TABLET ORAL 3 TIMES DAILY
Qty: 90 TABLET | Refills: 1 | Status: SHIPPED | OUTPATIENT
Start: 2024-04-15

## 2024-06-26 ENCOUNTER — OFFICE VISIT (OUTPATIENT)
Dept: BEHAVIORAL HEALTH | Facility: CLINIC | Age: 24
End: 2024-06-26
Payer: COMMERCIAL

## 2024-06-26 VITALS
BODY MASS INDEX: 25.87 KG/M2 | SYSTOLIC BLOOD PRESSURE: 120 MMHG | HEIGHT: 63 IN | HEART RATE: 74 BPM | WEIGHT: 146 LBS | OXYGEN SATURATION: 100 % | DIASTOLIC BLOOD PRESSURE: 78 MMHG

## 2024-06-26 DIAGNOSIS — F41.0 PANIC DISORDER: ICD-10-CM

## 2024-06-26 DIAGNOSIS — F40.10 SOCIAL ANXIETY DISORDER: Primary | ICD-10-CM

## 2024-06-26 DIAGNOSIS — F41.1 GENERALIZED ANXIETY DISORDER: ICD-10-CM

## 2024-06-26 DIAGNOSIS — F33.1 MODERATE EPISODE OF RECURRENT MAJOR DEPRESSIVE DISORDER: ICD-10-CM

## 2024-06-26 PROCEDURE — 90792 PSYCH DIAG EVAL W/MED SRVCS: CPT | Performed by: NURSE PRACTITIONER

## 2024-06-26 RX ORDER — HYDROXYZINE HYDROCHLORIDE 25 MG/1
TABLET, FILM COATED ORAL
Qty: 90 TABLET | Refills: 1 | Status: SHIPPED | OUTPATIENT
Start: 2024-06-26 | End: 2024-06-26

## 2024-06-26 RX ORDER — BUSPIRONE HYDROCHLORIDE 10 MG/1
10 TABLET ORAL 3 TIMES DAILY
Qty: 90 TABLET | Refills: 1 | Status: SHIPPED | OUTPATIENT
Start: 2024-06-26 | End: 2024-06-26

## 2024-06-26 RX ORDER — HYDROXYZINE PAMOATE 25 MG/1
25 CAPSULE ORAL DAILY PRN
Qty: 30 CAPSULE | Refills: 2 | Status: SHIPPED | OUTPATIENT
Start: 2024-06-26

## 2024-06-26 RX ORDER — CITALOPRAM 20 MG/1
20 TABLET ORAL DAILY
Qty: 30 TABLET | Refills: 2 | Status: SHIPPED | OUTPATIENT
Start: 2024-06-26

## 2024-06-26 RX ORDER — BUSPIRONE HYDROCHLORIDE 15 MG/1
15 TABLET ORAL 2 TIMES DAILY
Qty: 60 TABLET | Refills: 2 | Status: SHIPPED | OUTPATIENT
Start: 2024-06-26

## 2024-06-26 RX ORDER — OMEPRAZOLE 20 MG/1
20 CAPSULE, DELAYED RELEASE ORAL 2 TIMES DAILY
COMMUNITY
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
## 2024-07-19 ENCOUNTER — OFFICE VISIT (OUTPATIENT)
Dept: FAMILY MEDICINE CLINIC | Facility: CLINIC | Age: 24
End: 2024-07-19
Payer: COMMERCIAL

## 2024-07-19 DIAGNOSIS — G43.709 CHRONIC MIGRAINE WITHOUT AURA WITHOUT STATUS MIGRAINOSUS, NOT INTRACTABLE: ICD-10-CM

## 2024-07-19 DIAGNOSIS — Z00.00 ENCOUNTER FOR ROUTINE ADULT MEDICAL EXAMINATION: Primary | ICD-10-CM

## 2024-07-19 PROCEDURE — 99214 OFFICE O/P EST MOD 30 MIN: CPT | Performed by: PHYSICIAN ASSISTANT

## 2024-07-19 PROCEDURE — 99395 PREV VISIT EST AGE 18-39: CPT | Performed by: PHYSICIAN ASSISTANT

## 2024-07-19 NOTE — PROGRESS NOTES
"Chief Complaint  Annual Exam    Subjective          Ann Euceda presents to [facility]    History of Present Illness  Patient is here today for her annual physical, blood work and medication refills.  She feels fine, reports changes by mental health professional to her medications and doing much better.  No specific complaints today.  She does not want a pap today, she is not sexually active.       Objective   Vital Signs:   /76   Pulse 101   Ht 160 cm (63\")   Wt 66.1 kg (145 lb 11.2 oz)   SpO2 99%   BMI 25.81 kg/m²     Body mass index is 25.81 kg/m².    Review of Systems   Constitutional: Negative.  Negative for chills, fatigue and fever.   HENT: Negative.     Eyes: Negative.    Respiratory: Negative.  Negative for cough and shortness of breath.    Cardiovascular: Negative.  Negative for chest pain and palpitations.   Gastrointestinal: Negative.    Endocrine: Negative.    Genitourinary: Negative.    Musculoskeletal: Negative.  Negative for back pain and myalgias.   Skin: Negative.    Allergic/Immunologic: Negative.    Neurological: Negative.  Negative for dizziness and headache.   Hematological: Negative.    Psychiatric/Behavioral: Negative.  Negative for depressed mood. The patient is not nervous/anxious.        Past History:  Medical History: has a past medical history of Anxiety, Bronchitis, Crohn's disease, Ear infection, Gastritis, GERD (gastroesophageal reflux disease), Headache, Hypertension, Irritable bowel syndrome, and Lactose intolerance.   Surgical History: has a past surgical history that includes Colonoscopy; Cholecystectomy; and [place] tooth extraction.   Family History: family history includes Diabetes in her maternal grandmother and paternal grandmother; Hyperlipidemia in her father, maternal grandfather, paternal grandfather, and sister; Hypertension in her father.   Social History: reports that she has never smoked. She has never used smokeless " tobacco. She reports that she does not currently use alcohol. She reports that she does not use drugs.      Current Outpatient Medications:     albuterol sulfate  (90 Base) MCG/ACT inhaler, Inhale 2 puffs Every 4 (Four) Hours As Needed for Wheezing or Shortness of Air., Disp: 18 g, Rfl: 5    busPIRone (BUSPAR) 15 MG tablet, Take 1 tablet by mouth 2 (Two) Times a Day., Disp: 60 tablet, Rfl: 2    citalopram (CeleXA) 20 MG tablet, Take 1 tablet by mouth Daily., Disp: 30 tablet, Rfl: 2    galcanezumab-gnlm (Emgality) 120 MG/ML auto-injector pen, Inject  under the skin into the appropriate area as directed., Disp: , Rfl:     hydrOXYzine pamoate (VISTARIL) 25 MG capsule, Take 1 capsule by mouth Daily As Needed for Anxiety., Disp: 30 capsule, Rfl: 2    loperamide (IMODIUM) 2 MG capsule, Take 1 capsule by mouth Daily., Disp: , Rfl:     nadolol (CORGARD) 20 MG tablet, TAKE 1 TABLET BY MOUTH DAILY, Disp: 30 tablet, Rfl: 5    naproxen (NAPROSYN) 250 MG tablet, Take 1 tablet by mouth 2 (Two) Times a Day As Needed., Disp: , Rfl:     norethindrone-ethinyl estradiol-ferrous fumarate (Blisovi 24 Fe) 1-20 MG-MCG(24) per tablet, Take  by mouth., Disp: , Rfl:     omeprazole (priLOSEC) 20 MG capsule, Take 1 capsule by mouth 2 (Two) Times a Day., Disp: , Rfl:     Allergies: Sulfamethoxazole-trimethoprim, Lactose intolerance (gi), Sulfa antibiotics, and Sulfasalazine    Physical Exam  Vitals reviewed.   Constitutional:       Appearance: Normal appearance.   HENT:      Head: Normocephalic and atraumatic.      Right Ear: Tympanic membrane, ear canal and external ear normal.      Left Ear: Tympanic membrane, ear canal and external ear normal.      Nose: Nose normal.      Mouth/Throat:      Mouth: Mucous membranes are moist.   Eyes:      Extraocular Movements: Extraocular movements intact.      Pupils: Pupils are equal, round, and reactive to light.   Cardiovascular:      Rate and Rhythm: Normal rate and regular rhythm.      Pulses:  Normal pulses.      Heart sounds: Normal heart sounds.   Pulmonary:      Effort: Pulmonary effort is normal.      Breath sounds: Normal breath sounds.   Abdominal:      General: Abdomen is flat. Bowel sounds are normal.      Palpations: Abdomen is soft.   Musculoskeletal:         General: Normal range of motion.      Cervical back: Normal range of motion and neck supple.   Skin:     General: Skin is warm and dry.   Neurological:      General: No focal deficit present.      Mental Status: She is alert and oriented to person, place, and time.   Psychiatric:         Mood and Affect: Mood normal.         Behavior: Behavior normal.          Result Review :                   Assessment and Plan    Diagnoses and all orders for this visit:    1. Encounter for routine adult medical examination (Primary)  Assessment & Plan:  Discussed injury prevention, diet and exercise, safe sexual practices, and screening for common diseases. Encouraged use of sunscreen and seatbelts. Encouraged SBE, avoidance of tobacco, limiting alcohol, and yearly dental and eye exams.       2. Chronic migraine without aura without status migrainosus, not intractable  Assessment & Plan:  Headaches are stable.    Plan:  Continue same medication/s without change.     Discussed medication dosage, use, side effects, and goals of treatment in detail.    Discussed monitoring symptoms and use of quick-relief medications and maintenance medication.    General Treatment Goals:   symptom prevention  minimize work absence  minimizing limitation in activity  prevention of exacerbations  decrease use of ER/inpatient care  minimization of adverse effects of treatment   She takes Corgard daily for prevention alson with Emgality injection monthly and Narpoxen as needed.      Followup in 1 year                      Follow Up   No follow-ups on file.  Patient was given instructions and counseling regarding her condition or for health maintenance advice. Please see  specific information pulled into the AVS if appropriate.     Vesta Quinones PA-C

## 2024-07-22 VITALS
WEIGHT: 145.7 LBS | DIASTOLIC BLOOD PRESSURE: 76 MMHG | OXYGEN SATURATION: 99 % | HEART RATE: 101 BPM | SYSTOLIC BLOOD PRESSURE: 118 MMHG | BODY MASS INDEX: 25.82 KG/M2 | HEIGHT: 63 IN

## 2024-07-23 PROBLEM — E66.3 OVERWEIGHT (BMI 25.0-29.9): Status: ACTIVE | Noted: 2023-03-24

## 2024-07-23 PROBLEM — K50.90 CROHN'S DISEASE, UNSPECIFIED, WITHOUT COMPLICATIONS: Status: ACTIVE | Noted: 2024-07-23

## 2024-07-23 PROBLEM — F41.1 GENERALIZED ANXIETY DISORDER: Status: ACTIVE | Noted: 2024-07-23

## 2024-07-23 PROBLEM — I10 PRIMARY HYPERTENSION: Status: RESOLVED | Noted: 2023-02-14 | Resolved: 2024-07-23

## 2024-07-23 PROBLEM — Z00.00 ENCOUNTER FOR ROUTINE ADULT MEDICAL EXAMINATION: Status: ACTIVE | Noted: 2024-07-23

## 2024-07-23 RX ORDER — NORETHINDRONE ACETATE AND ETHINYL ESTRADIOL 1MG-20(24)
KIT ORAL
COMMUNITY

## 2024-07-23 RX ORDER — GALCANEZUMAB 120 MG/ML
INJECTION, SOLUTION SUBCUTANEOUS
COMMUNITY

## 2024-07-23 NOTE — ASSESSMENT & PLAN NOTE
Patient's (Body mass index is 25.81 kg/m².) indicates that they are overweight with health conditions that include hypertension . Weight is improving with lifestyle modifications. BMI is is above average; BMI management plan is completed. We discussed low calorie, low carb based diet program, portion control, and increasing exercise.

## 2024-07-23 NOTE — ASSESSMENT & PLAN NOTE
Headaches are stable.    Plan:  Continue same medication/s without change.     Discussed medication dosage, use, side effects, and goals of treatment in detail.    Discussed monitoring symptoms and use of quick-relief medications and maintenance medication.    General Treatment Goals:   symptom prevention  minimize work absence  minimizing limitation in activity  prevention of exacerbations  decrease use of ER/inpatient care  minimization of adverse effects of treatment   She takes Corgard daily for prevention alson with Emgality injection monthly and Narpoxen as needed.      Followup in 1 year

## 2024-10-13 DIAGNOSIS — F33.1 MODERATE EPISODE OF RECURRENT MAJOR DEPRESSIVE DISORDER: ICD-10-CM

## 2024-10-13 DIAGNOSIS — F41.0 PANIC DISORDER: ICD-10-CM

## 2024-10-13 DIAGNOSIS — F41.1 GENERALIZED ANXIETY DISORDER: ICD-10-CM

## 2024-10-14 RX ORDER — CITALOPRAM HYDROBROMIDE 20 MG/1
20 TABLET ORAL DAILY
Qty: 30 TABLET | Refills: 2 | OUTPATIENT
Start: 2024-10-14